# Patient Record
Sex: FEMALE | Race: WHITE | NOT HISPANIC OR LATINO | URBAN - METROPOLITAN AREA
[De-identification: names, ages, dates, MRNs, and addresses within clinical notes are randomized per-mention and may not be internally consistent; named-entity substitution may affect disease eponyms.]

---

## 2022-02-09 ENCOUNTER — PREP FOR PROCEDURE (OUTPATIENT)
Dept: GASTROENTEROLOGY | Facility: CLINIC | Age: 72
End: 2022-02-09

## 2022-02-09 ENCOUNTER — TELEPHONE (OUTPATIENT)
Dept: GASTROENTEROLOGY | Facility: CLINIC | Age: 72
End: 2022-02-09

## 2022-02-09 DIAGNOSIS — K92.2 SMALL INTESTINAL HEMORRHAGE: Primary | ICD-10-CM

## 2022-02-09 NOTE — CONFIDENTIAL NOTE
Called pt to provide registration phone number to create Epic chart. Pt was able to do this. Message then routed to Dr Kramer and in patient team to determine plan for patient, currently admitted at [place].    Per Dr Kramer:  We were going try to do this as an outpatient. It sounded like should could be discharged relatively soon. We'll need the capsule CD to review.     Please assist in scheduling:     Procedure/Imaging/Clinic: antegrade enteroscopy   Physician: Williams/Omer   Timing: ASAP   Procedure length: 60 min   Anesthesia: Gen   Dx: small bowel bleeding   Tier: 2   Location: [facility] OR    Call made to AUGUSTA Marsh case manager, explained that the video capsule CD will need to be sent via mail, contacted new records dept to assist in getting this as expeditiously as possible  Salma will update patient that this procedure will be done outpatient, once CD obtained will have better plan in place. Will tentatively hold date of 2/28 with Williams for procedure - case request held, message routed to OR  to hold date    Sivan Rao RN, BSN,   [facility]  Care coordinator

## 2022-02-09 NOTE — CONFIDENTIAL NOTE
Message recived from Dr Kramer that [facility] provider has plans to transfer pt to [place] for this procedure.   Will send message to records dept to cancel VCS request and OR  to cancel procedure hold date.

## 2022-02-09 NOTE — TELEPHONE ENCOUNTER
[facility] Call Center    Phone Message    May a detailed message be left on voicemail: yes     Reason for Call: Other:      [facility] in [place], [place] is requesting a call back to schedule the previously discussed out patient  balloon enteroscopy and what documentation the clinic will need.    She is requesting a call back today.    Phone #:[phone]    Action Taken: Message routed to:  [facility] Panc/Bili    Travel Screening: Not Applicable

## 2022-02-10 ENCOUNTER — TRANSCRIBE ORDERS (OUTPATIENT)
Dept: OTHER | Age: 72
End: 2022-02-10

## 2022-02-10 DIAGNOSIS — K92.2 GASTROINTESTINAL HEMORRHAGE, UNSPECIFIED GASTROINTESTINAL HEMORRHAGE TYPE: Primary | ICD-10-CM
